# Patient Record
Sex: FEMALE | Race: WHITE
[De-identification: names, ages, dates, MRNs, and addresses within clinical notes are randomized per-mention and may not be internally consistent; named-entity substitution may affect disease eponyms.]

---

## 2017-09-23 ENCOUNTER — HOSPITAL ENCOUNTER (EMERGENCY)
Dept: HOSPITAL 62 - ER | Age: 37
Discharge: HOME | End: 2017-09-23
Payer: SELF-PAY

## 2017-09-23 VITALS — DIASTOLIC BLOOD PRESSURE: 66 MMHG | SYSTOLIC BLOOD PRESSURE: 115 MMHG

## 2017-09-23 DIAGNOSIS — H92.02: Primary | ICD-10-CM

## 2017-09-23 DIAGNOSIS — R05: ICD-10-CM

## 2017-09-23 DIAGNOSIS — F17.200: ICD-10-CM

## 2017-09-23 PROCEDURE — 99283 EMERGENCY DEPT VISIT LOW MDM: CPT

## 2017-09-23 PROCEDURE — 71020: CPT

## 2017-09-23 PROCEDURE — 94640 AIRWAY INHALATION TREATMENT: CPT

## 2017-09-23 PROCEDURE — 96372 THER/PROPH/DIAG INJ SC/IM: CPT

## 2017-09-23 NOTE — ER DOCUMENT REPORT
ED ENT





- General


Chief Complaint: Ear Pain


Stated Complaint: EAR PAIN


Time Seen by Provider: 09/23/17 19:11


Notes: 





Patient is a 37-year-old female presents emergency department complaining of 

left ear pain.  Patient states that she has had a head cold and cough for about 

2 weeks it has not gone away woke up today with left ear pain.  She denies any 

fevers, chills.  Otherwise tolerating p.o. without any difficulty.  Denies any 

lethargy.  She admits to productive cough but denies any yellow sputum.


TRAVEL OUTSIDE OF THE [place] IN LAST 30 DAYS: No





- Related Data


Allergies/Adverse Reactions: 


 





azithromycin [From Zithromax] Allergy (Verified 09/23/17 18:56)


 











Past Medical History





- Social History


Smoking Status: Current Every Day Smoker


Chew tobacco use (# tins/day): No


Frequency of alcohol use: None


Drug Abuse: None


Family History: Reviewed & Not Pertinent





- Past Medical History


Cardiac Medical History: 


   Denies: Hx Atrial Fibrillation, Hx Coronary Artery Disease, Hx Heart Attack, 

Hx Hypertension


Pulmonary Medical History: Reports: Hx Pneumonia - HX OF


   Denies: Hx Asthma, Hx Bronchitis, Hx COPD, Hx Tuberculosis


Neurological Medical History: Denies: Hx Cerebrovascular Accident, Hx Seizures


Renal/ Medical History: Denies: Hx Peritoneal Dialysis


Musculoskeltal Medical History: Denies Hx Arthritis


Past Surgical History: Reports: Hx Cholecystectomy.  Denies: Hx Pacemaker





- Immunizations


Hx Diphtheria, Pertussis, Tetanus Vaccination: Yes - 5/2012





Review of Systems





- Review of Systems


Constitutional: No symptoms reported


EENT: See HPI


Cardiovascular: See HPI


Respiratory: See HPI


Gastrointestinal: No symptoms reported


-: Yes All other systems reviewed and negative





Physical Exam





- Vital signs


Vitals: 


 











Temp Pulse Resp BP Pulse Ox


 


 98.9 F   98   18   117/86 H  97 


 


 09/23/17 18:56  09/23/17 18:56  09/23/17 18:56  09/23/17 18:56  09/23/17 18:56














- Notes


Notes: 





PHYSICAL EXAM


GENERAL: Alert, interacts well. 


HEAD: Normocephalic, atraumatic.


EYES: Pupils equal, round, and reactive to light. Extraocular movements intact.


ENT: Oral mucosa moist, tongue midline.  Injected tympanic membrane with 

bulging of the left ear.  Right ear normal without any evidence of pinna motion 

tenderness, external auditory canal inflammation.


NECK: Full range of motion. Supple. Trachea midline.


LUNGS: Mild bilateral wheezes, without rales, or rhonchi. No respiratory 

distress.


HEART: Regular rate and rhythm. No murmurs, gallops, or rubs.


ABDOMEN: Soft,  nondistended, nontender. No guarding, rebound, or rigidity.. 

Bowel sounds present in all 4 quadrants.


EXTREMITIES: Moves all 4 extremities spontaneously. No edema, radial and 

dorsalis pedis pulses 2/4 bilaterally. No cyanosis. 


NEUROLOGICAL: Alert and oriented x4. Normal speech.


PSYCH: Normal affect, normal mood.


SKIN: Warm, dry, normal turgor. No rashes or lesions noted.








Course





- Re-evaluation


Re-evalutation: 





09/23/17 20:38


patient is a 37-year-old female is hemodynamically stable, no acute distress 

and afebrile.  No evidence of purulent material of the left ear but injected 

and painful.  Afebrile.  No evidence of pneumonia noted on chest x-ray.  

Patient clinically feels better after breathing treatments.  Will discharge 

home on albuterol and steroid.





- Vital Signs


Vital signs: 


 











Temp Pulse Resp BP Pulse Ox


 


 98.9 F   98   18   117/86 H  97 


 


 09/23/17 18:56  09/23/17 18:56  09/23/17 18:56  09/23/17 18:56  09/23/17 18:56














- Diagnostic Test


Radiology reviewed: Image reviewed, Reports reviewed





Discharge





- Discharge


Clinical Impression: 


 Cough





Ear pain


Qualifiers:


 Laterality: left Qualified Code(s): H92.02 - Otalgia, left ear





Condition: Good


Disposition: HOME, SELF-CARE


Instructions:  Serous Otitis Media ([facility]), Bronchitis ([facility])


Prescriptions: 


Albuterol Sulfate [Proair HFA Inhalation Aerosol 8.5 gm MDI] 2 puff IH Q4H PRN #

1 mdi


 PRN Reason: 


Amoxicillin 875 mg PO BID #14 tablet


Methylprednisolone [Medrol Dosepack (4 mg/Tab) 21 Tab/Dosepak] 4 mg PO ASDIR 

PRN #21 tab.ds.pk


 PRN Reason:

## 2017-09-23 NOTE — RADIOLOGY REPORT (SQ)
EXAM DESCRIPTION:  CHEST PA/LAT



COMPLETED DATE/TIME:  9/23/2017 7:43 pm



REASON FOR STUDY:  wheezing, cough



COMPARISON:  December 2016



EXAM PARAMETERS:  NUMBER OF VIEWS: two views

TECHNIQUE: Digital Frontal and Lateral radiographic views of the chest acquired.

RADIATION DOSE: NA

LIMITATIONS: none



FINDINGS:  LUNGS AND PLEURA: No opacities, masses or pneumothorax. No pleural effusion.

MEDIASTINUM AND HILAR STRUCTURES: No masses or contour abnormalities.

HEART AND VASCULAR STRUCTURES: Heart normal size.  No evidence for failure.

BONES: No acute findings.

HARDWARE: None in the chest.

OTHER: No other significant finding.



IMPRESSION:  NO SIGNIFICANT RADIOGRAPHIC FINDING IN THE CHEST.



TECHNICAL DOCUMENTATION:  JOB ID:  [id number]

 2011 [employer]- All Rights Reserved

## 2018-11-04 ENCOUNTER — HOSPITAL ENCOUNTER (EMERGENCY)
Dept: HOSPITAL 62 - ER | Age: 38
LOS: 1 days | Discharge: HOME | End: 2018-11-05
Payer: SELF-PAY

## 2018-11-04 DIAGNOSIS — R51: ICD-10-CM

## 2018-11-04 DIAGNOSIS — J06.9: ICD-10-CM

## 2018-11-04 DIAGNOSIS — Z88.3: ICD-10-CM

## 2018-11-04 DIAGNOSIS — H92.02: ICD-10-CM

## 2018-11-04 DIAGNOSIS — R03.0: Primary | ICD-10-CM

## 2018-11-04 DIAGNOSIS — Z90.49: ICD-10-CM

## 2018-11-04 PROCEDURE — 99283 EMERGENCY DEPT VISIT LOW MDM: CPT

## 2018-11-05 VITALS — DIASTOLIC BLOOD PRESSURE: 86 MMHG | SYSTOLIC BLOOD PRESSURE: 136 MMHG

## 2018-11-05 NOTE — ER DOCUMENT REPORT
ED General





- General


Chief Complaint: Ear Pain


Stated Complaint: EAR PAIN


Time Seen by Provider: 11/05/18 00:17


Mode of Arrival: Ambulatory


Information source: Patient


Notes: 





38-year-old female with no reported past medical history presents with 

complaint of left ear pain that started 1 day prior to arrival.  Patient 

describes the pain as aching, throbbing with radiation into her face.  She 

states that her right ear began hurting her 2 hours prior to arrival.  Patient 

does admit to rhinorrhea, nasal congestion, mild headache.  She denies any 

fever or chills, nausea, vomiting, productive cough, chest pain, abdominal 

pain.  She has been using Mucinex with minimal relief.


TRAVEL OUTSIDE OF THE [place] IN LAST 30 DAYS: No





- HPI


Onset: Yesterday


Onset/Duration: Gradual, Persistent, Worse


Quality of pain: Pressure, Throbbing


Severity: Moderate


Associated symptoms: Earache, Rhinnorhea.  denies: Chest pain, Nonproductive 

cough, Productive cough, Fever, Shortness of breath


Exacerbated by: Denies


Relieved by: Denies


Similar symptoms previously: No


Recently seen / treated by doctor: No





- Related Data


Allergies/Adverse Reactions: 


 





azithromycin [From Zithromax] Allergy (Verified 09/23/17 18:56)


 











Past Medical History





- General


Information source: Patient, [facility] Records





- Social History


Smoking Status: Never Smoker


Frequency of alcohol use: None


Drug Abuse: None


Lives with: Family


Family History: Reviewed & Not Pertinent


Patient has suicidal ideation: No


Patient has homicidal ideation: No





- Past Medical History


Cardiac Medical History: 


   Denies: Hx Atrial Fibrillation, Hx Coronary Artery Disease, Hx Heart Attack, 

Hx Hypertension


Pulmonary Medical History: Reports: Hx Pneumonia - HX OF


   Denies: Hx Asthma, Hx Bronchitis, Hx COPD, Hx Tuberculosis


Neurological Medical History: Denies: Hx Cerebrovascular Accident, Hx Seizures


Renal/ Medical History: Denies: Hx Peritoneal Dialysis


Musculoskeletal Medical History: Denies Hx Arthritis


Past Surgical History: Reports: Hx Cholecystectomy.  Denies: Hx Pacemaker





- Immunizations


Hx Diphtheria, Pertussis, Tetanus Vaccination: Yes - 5/2012





Review of Systems





- Review of Systems


Notes: 





REVIEW OF SYSTEMS:


CONSTITUTIONAL :  Denies fever,  chills, or sweats.  Denies recent illness. 

Denies weight loss, recent hospitalizations. 


EENT: Denies visual changes, eye pain.  Denies sore throat, oral lesions, 

difficulty swallowing.


CARDIOVASCULAR:  Denies chest pain.  Denies palpitations. Denies lower 

extremity edema.


RESPIRATORY:  Denies cough. Denies shortness of breath, wheezing.


GASTROINTESTINAL:  Denies abdominal pain or distention.  Denies nausea, vomiting

, or diarrhea.  Denies blood in vomitus, stools, or per rectum.  Denies black, 

tarry stools.  Denies constipation.  


GENITOURINARY:  Denies difficulty urinating, painful urination,  frequency, 

blood in urine, or  vaginal discharge.


MUSCULOSKELETAL:  Denies back or neck pain or stiffness.  Denies joint pain or 

swelling.


SKIN:   Denies rash, lesions or sores.


HEMATOLOGIC :   Denies easy bruising or bleeding.


LYMPHATIC:  Denies swollen glands.


NEUROLOGICAL:  Denies confusion or altered mental status.  Denies loss of 

consciousness.  Denies dizziness or lightheadedness.  Denies headache.  Denies 

weakness or paralysis.  Denies problems difficulty with ambulation, slurred 

speech.  Denies sensory loss, numbness, or tingling.  Denies seizures.


PSYCHIATRIC:  Denies anxiety or stress.  Denies depression, suicidal ideation, 

or homicidal ideation.  Denies visual or auditory hallucinations.








Physical Exam





- Vital signs


Vitals: 


 











Temp Pulse Resp BP Pulse Ox


 


 98.7 F   87   18   136/86 H  97 


 


 11/05/18 00:08  11/05/18 00:08  11/05/18 00:08  11/05/18 00:08  11/05/18 00:08














- Notes


Notes: 





PHYSICAL EXAMINATION:





GENERAL: Well-appearing, well-nourished and in no acute distress.





HEAD: Atraumatic, normocephalic.





EYES: Pupils equal round and reactive to light, extraocular movements intact, 

conjunctiva are normal.





ENT: Nares patent, oropharynx clear without exudates.  Moist mucous membranes. 

Left TM erythematous/bulging. 





NECK: Normal range of motion, supple without lymphadenopathy





LUNGS: Breath sounds clear to auscultation bilaterally and equal.  No wheezes 

rales or rhonchi.





HEART: Regular rate and rhythm without murmurs





ABDOMEN: Soft, nontender, nondistended abdomen.  No guarding, no rebound.  No 

masses appreciated.





Female : deferred





Musculoskeletal: Normal range of motion, no pitting or edema.  No cyanosis.





NEUROLOGICAL: Cranial nerves grossly intact.  Normal speech, normal gait.  

Normal sensory, motor exams





PSYCH: Normal mood, normal affect.





SKIN: Warm, Dry, normal turgor, no rashes or lesions noted.





Course





- Re-evaluation


Re-evalutation: 





11/05/18 00:43


Presentation is most consistent with a viral upper respiratory infection and 

left otitis media.  Patient is overall well appearance, vitals within normal 

limits, well-hydrated.  Patient denies any headache, neck pain, and has no 

evidence of meningismus on examination.  Lungs are clear bilaterally.  No 

evidence of respiratory distress.  Based on clinical exam and history, I do not 

suspect an acute pneumonia, meningitis, strep pharyngitis, or an acute 

encephalitis.  No laboratory or imaging testing is indicated at this time.  

Patient did receive Motrin, Augmentin and Sudafed during her ED course.  Will 

discharge patient with return precautions and followup recommendations.  They 

are in agreement this plan have verbalized understanding return precautions.


11/05/18 00:50








- Vital Signs


Vital signs: 


 











Temp Pulse Resp BP Pulse Ox


 


 98.7 F   87   18   136/86 H  97 


 


 11/05/18 00:08  11/05/18 00:08  11/05/18 00:08  11/05/18 00:08  11/05/18 00:08














Discharge





- Discharge


Clinical Impression: 


 Left ear pain, Rhinorrhea, Elevated blood pressure reading





URI (upper respiratory infection)


Qualifiers:


 URI type: unspecified URI Qualified Code(s): J06.9 - Acute upper respiratory 

infection, unspecified





Condition: Good


Disposition: HOME, SELF-CARE


Instructions:  Otitis Media ([facility]), Upper Respiratory Illness ([facility])


Additional Instructions: 


Your symptoms are most likely due to a viral infection it should resolve over 

the next 7-14 days.  You should take over-the-counter guanfacine per bottle 

instructions to help thin the mucus. For nasal congestion: I would recommend 

that you get over-the-counter oxymetazoline also known is afrin.  Use only per 

bottle instructions and be sure to never use this for more than 3 days if you 

can develop severe rebound congestion. You may also use tylenol or ibuprofen as 

needed for aches and thorat discomfort. Please be sure to drink plenty of 

fluids and get rest. Return to the emergency department he began having 

difficulty breathing, chest pain, persistent vomiting, or any other symptoms 

that are concerning to you. 


Prescriptions: 


Amox Tr/Potassium Clavulanate [Augmentin 875-125 Tablet] 1 tab PO BID 10 Days #

20 tablet


Fluticasone Propionate [Flonase Nasal Spray 50 Mcg/Spray 16 gm] 2 sprays NASL 

Q12 #1 inhaler


Ibuprofen [Motrin 600 Mg Tablet] 600 mg PO TID #15 tablet


Forms:  Elevated Blood Pressure

## 2019-09-17 NOTE — ER DOCUMENT REPORT
HPI





- HPI


Time Seen by Provider: 09/17/19 17:26


Pain Level: 2


Notes: 





Patient is a 39-year-old female with no significant past medical history who 

presents complaining of bilateral knee pain right worse than the left is been an

ongoing issue for a couple months.  Patient states that on occasion she will 

feel like her right knee wants to buckle on her and she will develop soreness 

that does not radiate.  Patient states that she has been putting more weight on 

her left knee since the right has been bothering her which has resulted in the 

left knee becoming painful.  She has not noticed any swelling or bruising.  

Denies injury.  Denies any headache, fever, head injury, neck pain, URI, sore 

throat, chest pain, palpitations, syncope, cough, shortness of breath, wheeze, 

dyspnea, abdominal pain, nausea/vomiting/diarrhea, urinary retention, dysuria, 

hematuria, back pain, loss of control of bowel or bladder, numbness/tingling, 

saddle anesthesia, muscle paralysis/weakness, or rash.





- ROS


Systems Reviewed and Negative: Yes All other systems reviewed and negative





- REPRODUCTIVE


Reproductive: DENIES: Pregnant:





Past Medical History





- Social History


Smoking Status: Unknown if Ever Smoked


Family History: Reviewed & Not Pertinent





- Past Medical History


Cardiac Medical History: 


   Denies: Hx Atrial Fibrillation, Hx Coronary Artery Disease, Hx Heart Attack, 

Hx Hypertension


Pulmonary Medical History: Reports: Hx Pneumonia - HX OF


   Denies: Hx Asthma, Hx Bronchitis, Hx COPD, Hx Tuberculosis


Neurological Medical History: Denies: Hx Cerebrovascular Accident, Hx Seizures


Renal/ Medical History: Denies: Hx Peritoneal Dialysis


Musculoskeletal Medical History: Denies Hx Arthritis


Past Surgical History: Reports: Hx Cholecystectomy.  Denies: Hx Pacemaker





- Immunizations


Hx Diphtheria, Pertussis, Tetanus Vaccination: Yes - 5/2012





[facility] Provider Document





- CONSTITUTIONAL


Agree With Documented VS: Yes


Notes: 





PHYSICAL EXAMINATION:





GENERAL: Well-appearing, well-nourished and in no acute distress.





LUNGS: Breath sounds clear to auscultation bilaterally and equal.  No wheezes 

rales or rhonchi.





HEART: Regular rate and rhythm without murmurs, rubs, gallops.





Musculoskeletal: B/l knees:  No obvious swelling, ecchymosis, effusion, or 

deformity.  FROM to passive/active and flexion >90 w/o difficulty or tenderness.

Strength 5+/5. N/V intact distal.  + mild joint line tenderness b/l knees.  

Ligamentous grossly stable, limited exam with larger leg size.  Keira grossly

negative.  Patellar grind negative.  No calf tenderness.





Extremities:  No cyanosis, clubbing, or edema b/l.  Peripheral pulses 2+.  

Capillary refill less than 3 seconds.  Shawn neg b/l.





NEUROLOGICAL: Normal speech, normal gait.  Normal sensory, motor exams 





PSYCH: Normal mood, normal affect.





SKIN: Warm, Dry, normal turgor, no rashes or lesions noted.





- INFECTION CONTROL


TRAVEL OUTSIDE OF THE [place] IN LAST 30 DAYS: No





Course





- Re-evaluation


Re-evalutation: 





09/17/19 17:27


Patient is an afebrile, well-hydrated, 39-year-old female who presents to the ED

with bilateral knee pain which could have the possibility of internal 

involvement.  Vitals are acceptable without any significant tachycardia, 

tachypnea, or hypoxia.  PE is otherwise unremarkable for any neurovascular 

compromise, obvious tendon/ligament rupture, obvious fracture/dislocation, 

septic joint.  Patient has 2 knee braces which she wears already.  Patient is 

nontoxic-appearing.  Patient is able to ambulate and weight-bear.  No other labs

or imaging warranted at this time based on H&P.  Conservative measures otherwise

for symptoms.  Recheck with your PCM in 3-5 days.  Schedule consult with 

orthopedics.  Return to the ED with any worsening/concerning symptoms otherwise 

as reviewed in discharge.  Patient is in agreement.





- Vital Signs


Vital signs: 


                                        











Temp Pulse Resp BP Pulse Ox


 


 98.8 F   110 H  18   117/74   97 


 


 09/17/19 17:08  09/17/19 17:08  09/17/19 17:08  09/17/19 17:08  09/17/19 17:08














Discharge





- Discharge


Clinical Impression: 


Bilateral knee pain


Qualifiers:


 Chronicity: acute Qualified Code(s): M25.561 - Pain in right knee; M25.562 - 

Pain in left knee





Condition: Stable


Disposition: HOME, SELF-CARE


Additional Instructions: 


Rest, Ice, Compression, Elevation


Tylenol/ibuprofen as needed


Light stretches daily


Strength exercises as able


Moist heat and massage may help


F/u with your PCP in 3-5 days for a recheck


Consider consult(s) with Orthopedics/physical therapy for ongoing/worsening sy

mptoms





Return to the ED with any worsening symptoms and/or development of fever, 

headache, chest pain, palpitations, syncope, shortness of breath, trouble 

breathing, abdominal pain, n/v/d, muscle weakness/paralysis, numbness/tingling, 

swelling, redness, or other worsening symptoms that are concerning to you.


Referrals: 


JAYNA YOUSSEF JR, DO [ACTIVE PROVISIONAL STAFF] - Follow up as needed

## 2020-09-02 NOTE — ER RDC ASSESSMENT REPORT
Intake





- In the Last 14 days


Have you traveled outside [place]?: No


Have you been in close contact with someone CONFIRMED: Yes


Worked in Healthcare?: No





- Symptoms


Subjective Fever(Felt feverish): No


Chills: No


Muscule Aches: No


Runny Nose: No


Sore Throat: No


Cough (New or worsening chronic cough): No


Shortness of breath: No


Nausea or Vomiting: No


Headache: No


Abdominal Pain: No


Diarrhea(3 or more loose stools in last 24 hours): No





- Do you have any of the following


Chronic lung disease: Asthma or emphysema or COPD: No


Cystic Fibrosis: No


Diabetes: No


High Blood Pressure: No


Cardiovascular Disease: No


Chronic Kidney Disease: No


Chronic Liver Disease: No


Chronic blood disorder like Sickle Cell Disease: No


Weak immune system due to disease or medication: No


Neurologic condition that limits movement: No


Developmental delay - Moderate to Severe: No


Recent (within past 2 weeks) or current Pregnancy: No


Morbid Obesity (>100 pounds over ideal weight): No


Obesity Comment: 





Height 5 feet 2 inches weight 160 pounds





- Objective


Temperature: 97.9 F


Pulse Rate: 105


Respiratory Rate: 18


Blood Pressure: 124/79


O2 Sat by Pulse Oximetry: 105


Objective: 


Given above, testing performed: 
































If Testing Performed:


Test Specimen Type Sent to











General





- General


Information source: Patient


Notes: 





Patient here at [facility] for cover testing patient reports coworker had tested 

positive and was exposed to the coworker last Friday.  Patient remains 

asymptomatic at this time.  Patient does not have a PCP.





- Related Data


Allergies/Adverse Reactions: 


                                        





azithromycin [From Zithromax] Allergy (Verified 09/17/19 17:05)


   











Past Medical History





- General


Information source: Patient





- Social History


Smoking Status: Current Every Day Smoker - A pack a day.


Smoking Education Provided: Yes - Stop smoking


Family History: Reviewed & Not Pertinent





- Past Medical History


Cardiac Medical History: 


   Denies: Hx Atrial Fibrillation, Hx Coronary Artery Disease, Hx Heart Attack, 

Hx Hypertension


Pulmonary Medical History: Reports: Hx Pneumonia - HX OF


   Denies: Hx Asthma, Hx Bronchitis, Hx COPD, Hx Tuberculosis


Neurological Medical History: Denies: Hx Cerebrovascular Accident, Hx Seizures


Renal/ Medical History: Denies: Hx Peritoneal Dialysis


Musculoskeletal Medical History: Denies Hx Arthritis


Past Surgical History: Reports: Hx Cholecystectomy.  Denies: Hx Pacemaker





Physical Exam





- General


General appearance: Appears well, Alert


In distress: None


Notes: 





PHYSICAL EXAMINATION: 


GENERAL: Well-appearing and in no acute distress. 


HEAD: Atraumatic, normocephalic. 


EYES: sclera anicteric, conjunctiva are normal. 


ENT: nares patent. Moist mucous membranes. 


NECK: Normal range of motion, supple without lymphadenopathy 


LUNGS: CTAB and equal. No wheezes rales or rhonchi.  Respirations even and 

unlabored. Lung sounds clear.


HEART: Regular rate and rhythm without murmurs 


ABDOMEN: Soft, nontender, normal bowel sounds, no guarding. 


EXTREMITIES: No cyanosis. 


NEUROLOGICAL:  Normal speech. 


PSYCH: Normal mood, normal affect. 


SKIN: Warm, Dry, normal turgor, 








Diagnostic Results


Laboratory Results: 


Pending COVID test results patient provided instructions regarding COVID to 

include: As a person under investigation for Covid 19, the [facility] of Health and Human Services, division of public health advises you 

to adhere to the following guidance until your test results are reported to you.

 If your test result is positive, you will receive additional information from 

your provider and your local health department at that time.


 


Remain at home until you are cleared by the health provider or public health 

authorities.


 


Keep a log of visitors to your home, notify any visitors to your home of your 

isolation status.


 


If you plan to move to a new address or leave the county, notify the local 

health department in your County.


 


Call your doctor or seek care if you have an urgent medical need.  Before 

seeking medical care, call ahead to get instructions from the provider before 

arriving at the medical office clinic or hospital.  Notify them that you are 

being tested for the virus that causes Covid 19 so that arrangements can be 

made, as necessary, to prevent transmission to others in the healthcare setting.

 Next, notify the local health department in your county.


 


If a medical emergency arises and you need to call 911, inform the first 

responders that you are being tested for the virus that causes Covid 19.  Next, 

notify the local health department in your county.





Patient Education/Counseling


Counseling/Education: 





Patient presents with upper respiratory symptoms worrisome for possible Covid 

19.  Patient does not have emergency worring symptoms such as difficulty 

breathing, shortness of breath, chest pain, pressure, confusion or cyanosis.  

Patient appears suitable for discharge.  Patient to follow-up with urgent care 

or to ED for any change in condition.  Patient's vital signs are stable and 

patient is nontoxic in appearance.  Good return precautions have been discussed 

with patient, patient verbalized understanding and is agreeable with discharge 

plan of care at this time.





RDC Discharge





- Discharge


Clinical Impression: 


 Encounter for screening laboratory testing for COVID-19 virus in asymptomatic 

patient





Condition: Stable


Disposition: Home; Selfcare